# Patient Record
Sex: MALE | Race: WHITE | Employment: OTHER | URBAN - METROPOLITAN AREA
[De-identification: names, ages, dates, MRNs, and addresses within clinical notes are randomized per-mention and may not be internally consistent; named-entity substitution may affect disease eponyms.]

---

## 2017-02-14 ENCOUNTER — OFFICE VISIT (OUTPATIENT)
Dept: OTOLARYNGOLOGY | Facility: CLINIC | Age: 77
End: 2017-02-14
Payer: COMMERCIAL

## 2017-02-14 ENCOUNTER — OFFICE VISIT (OUTPATIENT)
Dept: AUDIOLOGY | Facility: CLINIC | Age: 77
End: 2017-02-14
Payer: COMMERCIAL

## 2017-02-14 VITALS — WEIGHT: 199 LBS | HEIGHT: 69 IN | RESPIRATION RATE: 20 BRPM | BODY MASS INDEX: 29.47 KG/M2

## 2017-02-14 DIAGNOSIS — J31.0 CHRONIC RHINITIS: Primary | ICD-10-CM

## 2017-02-14 DIAGNOSIS — H65.93 OTITIS MEDIA WITH EFFUSION, BILATERAL: ICD-10-CM

## 2017-02-14 DIAGNOSIS — J34.89 NASAL OBSTRUCTION: ICD-10-CM

## 2017-02-14 DIAGNOSIS — H90.6 MIXED HEARING LOSS, BILATERAL: Primary | ICD-10-CM

## 2017-02-14 PROCEDURE — 92567 TYMPANOMETRY: CPT | Performed by: AUDIOLOGIST

## 2017-02-14 PROCEDURE — 31231 NASAL ENDOSCOPY DX: CPT | Performed by: OTOLARYNGOLOGY

## 2017-02-14 PROCEDURE — 92557 COMPREHENSIVE HEARING TEST: CPT | Performed by: AUDIOLOGIST

## 2017-02-14 PROCEDURE — 99207 ZZC NO CHARGE LOS: CPT | Performed by: AUDIOLOGIST

## 2017-02-14 PROCEDURE — 69433 CREATE EARDRUM OPENING: CPT | Performed by: OTOLARYNGOLOGY

## 2017-02-14 PROCEDURE — 99214 OFFICE O/P EST MOD 30 MIN: CPT | Mod: 25 | Performed by: OTOLARYNGOLOGY

## 2017-02-14 RX ORDER — FLUTICASONE PROPIONATE 50 MCG
2 SPRAY, SUSPENSION (ML) NASAL DAILY
Qty: 16 G | Refills: 3 | Status: SHIPPED | OUTPATIENT
Start: 2017-02-14 | End: 2018-05-14

## 2017-02-14 RX ORDER — OFLOXACIN 3 MG/ML
5 SOLUTION AURICULAR (OTIC) 2 TIMES DAILY
Qty: 10 ML | Refills: 0 | Status: SHIPPED | OUTPATIENT
Start: 2017-02-14 | End: 2017-02-21

## 2017-02-14 NOTE — MR AVS SNAPSHOT
"              After Visit Summary   2017    Bg Kramer    MRN: [id number]           Patient Information     Date Of Birth          1940        Visit Information        Provider Department      2017 12:30 PM Jorge [facility]osto [facility]        Today's Diagnoses     Mixed hearing loss, bilateral    -  1       Follow-ups after your visit        Who to contact     If you have questions or need follow up information about today's clinic visit or your schedule please contact [facility] directly at [phone].  Normal or non-critical lab and imaging results will be communicated to you by Cubeyouhart, letter or phone within 4 business days after the clinic has received the results. If you do not hear from us within 7 days, please contact the clinic through Cubeyouhart or phone. If you have a critical or abnormal lab result, we will notify you by phone as soon as possible.  Submit refill requests through HIT Community or call your pharmacy and they will forward the refill request to us. Please allow 3 business days for your refill to be completed.          Additional Information About Your Visit        MyChart Information     HIT Community lets you send messages to your doctor, view your test results, renew your prescriptions, schedule appointments and more. To sign up, go to [email or web address] . Click on \"Log in\" on the left side of the screen, which will take you to the Welcome page. Then click on \"Sign up Now\" on the right side of the page.     You will be asked to enter the access code listed below, as well as some personal information. Please follow the directions to create your username and password.     Your access code is: [email or web address]  Expires: 5/15/2017  1:02 PM     Your access code will  in 90 days. If you need help or a new code, please call your [facility] or [phone].        Care EveryWhere ID     This is your Care EveryWhere ID. This could be used by other " organizations to access your [place] medical records  [id number]         Blood Pressure from Last 3 Encounters:   No data found for BP    Weight from Last 3 Encounters:   02/14/17 199 lb (90.3 kg)   11/08/16 191 lb 12.8 oz (87 kg)              We Performed the Following     AUDIOGRAM/TYMPANOGRAM - INTERFACE     COMPREHENSIVE HEARING TEST     TYMPANOMETRY        Primary Care Provider Office Phone # Fax #    Avril NATHAN Angulo PA-C [phone]       [facility] [address] [place] [address]        Thank you!     Thank you for choosing [facility]  for your care. Our goal is always to provide you with excellent care. Hearing back from our patients is one way we can continue to improve our services. Please take a few minutes to complete the written survey that you may receive in the mail after your visit with us. Thank you!             Your Updated Medication List - Protect others around you: Learn how to safely use, store and throw away your medicines at www.disposemymeds.org.          This list is accurate as of: 2/14/17  1:02 PM.  Always use your most recent med list.                   Brand Name Dispense Instructions for use    ADVAIR DISKUS IN      Inhale 1 puff into the lungs 2 times daily.       albuterol 108 (90 BASE) MCG/ACT Inhaler   Generic drug:  albuterol      Inhale 1 puff into the lungs as needed.       aspirin 325 MG tablet      Take 325 mg by mouth 2 times daily.       ATENOLOL PO      Take 1 tablet by mouth once.       diclofenac 0.1 % ophthalmic solution    VOLTAREN    2.5 mL    Place 1 drop Into the left eye 3 times daily.       fish oil-omega-3 fatty acids 1000 MG capsule      Take 2 g by mouth daily.       ipratropium 0.06 % spray    ATROVENT    1 Box    Spray 2 sprays into both nostrils 3 times daily as needed for rhinitis       LISINOPRIL PO      Take  by mouth.       neomycin-polymyxin-dexamethasone 3.5-56065-7.1 Oint ophthalmic ointment    MAXITROL    1  Tube    Place 4 g into the right eye 3 times daily. Apply 1 squirt to sutures and right lower eyelid three times daily. Pieter Manuel M.D.       prednisoLONE acetate 1 % ophthalmic susp    PRED FORTE    10 mL    Place 1 drop Into the left eye 3 times daily.       SIMVASTATIN PO      Take 1 tablet by mouth once.

## 2017-02-14 NOTE — PROGRESS NOTES
Chief Complaint - Hearing loss    History of Present Illness - Bg Kramer is a 76 year old male who returns to me today with hearing loss in both ears.  It has been present and noticeable for approximately 4 months. I saw him 11/2016 and he had effusions on top of his sensorineural hearing loss. It was sudden in onset with bronchitis. There is no history of chronic ear disease or ear surgery, aside from one instance had right effusion that was drained.  With regards to recreational, , and work-related noise exposure has a lot from . No family history of hearing loss at a young age. The patient denies vertigo, otorrhea, otalgia. He still can't hear, has plugged ears.      Also has chronic nasal drainage, clear, both sides. Atrovent helped. However, he still has troubles breathing through the nose. Had cautery many years ago, and states breathing was difficult then. No bleeding. No pus. Wears oxygen for COPD.    Past Medical History -   Patient Active Problem List   Diagnosis     Macular drusen, ou     Entropion, involutional, right lower eyelid; hx Quickert repair     PSEUDOPHAKIA, ou        Current Medications -   Current Outpatient Prescriptions:      ipratropium (ATROVENT) 0.06 % nasal spray, Spray 2 sprays into both nostrils 3 times daily as needed for rhinitis, Disp: 1 Box, Rfl: 3     neomycin-polymyxin-dexamethasone (MAXITROL) 3.5-66517-0.1 OINT, Place 4 g into the right eye 3 times daily. Apply 1 squirt to sutures and right lower eyelid three times daily. Pieter Manuel M.D. , Disp: 1 Tube, Rfl: 1     prednisoLONE acetate (PRED FORTE) 1 % ophthalmic suspension, Place 1 drop Into the left eye 3 times daily., Disp: 10 mL, Rfl: 0     diclofenac (VOLTAREN) 0.1 % ophthalmic solution, Place 1 drop Into the left eye 3 times daily., Disp: 2.5 mL, Rfl: 0     LISINOPRIL PO, Take  by mouth., Disp: , Rfl:      SIMVASTATIN PO, Take 1 tablet by mouth once., Disp: , Rfl:      ATENOLOL PO, Take 1 tablet by  "mouth once., Disp: , Rfl:      Albuterol Sulfate (PROAIR HFA) 108 (90 BASE) MCG/ACT AERS, Inhale 1 puff into the lungs as needed., Disp: , Rfl:      Fluticasone-Salmeterol (ADVAIR DISKUS IN), Inhale 1 puff into the lungs 2 times daily., Disp: , Rfl:      fish oil-omega-3 fatty acids (FISH OIL) 1000 MG capsule, Take 2 g by mouth daily., Disp: , Rfl:      aspirin 325 MG tablet, Take 325 mg by mouth 2 times daily., Disp: , Rfl:     Allergies - No Known Allergies    Social History -   Social History     Social History     Marital Status:      Spouse Name: N/A     Number of Children: N/A     Years of Education: N/A     Social History Main Topics     Smoking status: Current Every Day Smoker     Smokeless tobacco: Not on file     Alcohol Use: Not on file     Drug Use: Not on file     Sexual Activity: Not on file     Other Topics Concern     Not on file     Social History Narrative     No narrative on file       Family History - noncontributory    Review of Systems - As per HPI and PMHx, otherwise 7 system review of the head and neck negative.    Physical Exam  Resp 20  Ht 1.75 m (5' 8.9\")  Wt 90.3 kg (199 lb)  BMI 29.47 kg/m2  General - The patient is in no distress.  Alert and oriented to person and place, answers questions and cooperates with examination appropriately.   Voice and Breathing - The patient was breathing comfortably without the use of accessory muscles. There was no wheezing, stridor, or stertor.  The patients voice was clear and strong.  Ears - The auricles are normal. Has bilateral middle ear effusion, no infection.   Eyes - Extraocular movements intact.  Sclera were not icteric or injected.  Neurological - Cranial nerves 2 through 12 were grossly intact. House-Brackmann grade 1 out of 6 bilaterally.   Nose - turbinates congested, septum mostly straight. No mass.    To further evaluate the nasal cavity, I performed flexible nasal endoscopy, and color photographs were taken for the permanent medical " record.  I first sprayed both sides of the nasal cavity with a mix of lidocaine and neosynephrine.  I then began on the right side using an adult flexible fiberoptic endoscope.  The septum was fairly straight and the nasal airway was open. Turbinates congested, no scarring. No abnormal secretions, purulence, or polyps were noted. The right middle turbinate and middle meatus were clearly visualized and normal in appearance.  Looking up, the olfactory cleft was unobstructed.  Going further back, the sphenoethmoid recess was normal in appearance, with healthy appearing mucosa on the face of the sphenoid.  The nasopharynx was unremarkable, and the eustachian tube opening on this side was unobstructed.    I then turned my attention to the left side.  Once again, the septum was fairly straight, and the airway was open, but turbinates congested.  No abnormal secretions, purulence, polyps were noted.  The left middle turbinate and middle meatus were clearly visualized and normal in appearance.  Looking up, the olfactory cleft was unobstructed.  Going further back the left sphenoethmoid recess was normal in appearance, and eustachian tube opening was unobstructed.    Audiologic Studies - An audiogram and tympanogram were performed at an outside facility, and I personally reviewed. The tympanogram shows mixed hearing loss with an airbone gap and also significant down-sloping high frequency sensorineural hearing loss.      Procedure - Bilateral Myringotomy without tube    Procedure - After discussion of the risks and benefits of myringotomy, informed consent was signed and placed in the chart.  I began with the RIGHT side.  I proceeded to position the patient in a semi-supine position in the examination chair.  Using the binocular surgical microscope, I then proceeded to clean the canal of cerumen and squamous debris.  I was able to see the tympanic membrane.  Using a small cotton tipped applicator, I applied a tiny coating of  phenol onto the tympanic membrane.  After visualizing a good diane, I then proceeded to use a myringotomy knife to make a radially oriented incision in the tympanic membrane.  A moderate amount of clear yellow effusion was suctioned away.    I know moved on to the left side. Using the binocular surgical microscope, I then proceeded to clean the canal of cerumen and squamous debris.  I was able to see the tympanic membrane.  Using a small cotton tipped applicator, I applied a tiny coating of phenol onto the tympanic membrane.  After visualizing a good diane, I then proceeded to use a myringotomy knife to make a radially oriented incision in the tympanic membrane.  A moderate amount of clear yellow effusion was suctioned away.    Assessment and Plan - Bg Kramer is a 76 year old male who has persistent otitis media with effusion bilaterally after 4-5 months. I discussed myringotomy with or without tubes today, and he preferred without tubes. NP scope shows chronic rhinitis, but no masses or infection. Ofloxacin prescribed, as was flonase. He also can continue atrovent for clear rhinorrhea. Return in 2-4 weeks for ear check.     Carmine Robertson MD  Otolaryngology  [facility]

## 2017-02-14 NOTE — NURSING NOTE
Flexible scope was used during today's office visit.  Scope number: [id number]. Color ID: Cristo Worthington, Clinic Medical Assistant

## 2017-02-14 NOTE — MR AVS SNAPSHOT
After Visit Summary   2/14/2017    Bg Kramer    MRN: [id number]           Patient Information     Date Of Birth          1940        Visit Information        Provider Department      2/14/2017 1:00 PM Carmine Robertson MD [facility]        Today's Diagnoses     Chronic rhinitis    -  1    Otitis media with effusion, bilateral        Nasal obstruction           Follow-ups after your visit        Additional Services     AUDIOLOGY ADULT REFERRAL       Your provider has referred you to: [facility]: [facility] [phone]   [email or web address]    Treatment:  Evaluation & Treatment  Specialty Testing:  Audiogram w/Tymps and Reflexes    Please be aware that coverage of these services is subject to the terms and limitations of your health insurance plan.  Call member services at your health plan with any benefit or coverage questions.      Please bring the following to your appointment:  >>   Any x-rays, CTs or MRIs which have been performed.  Contact the facility where they were done to arrange for  prior to your scheduled appointment.   >>   List of current medications  >>   This referral request   >>   Any documents/labs given to you for this referral                  Follow-up notes from your care team     Return in about 4 weeks (around 3/14/2017).      Your next 10 appointments already scheduled     Mar 08, 2017  1:00 PM CST   New Visit with Carmine Robertson MD   [facility] ([facility])    [address] [place] [id number] [phone]              Who to contact     If you have questions or need follow up information about today's clinic visit or your schedule please contact [facility] directly at [phone].  Normal or non-critical lab and imaging results will be communicated to you by MyChart, letter or phone within 4 business days after the clinic has received the  "results. If you do not hear from us within 7 days, please contact the clinic through Muzzley or phone. If you have a critical or abnormal lab result, we will notify you by phone as soon as possible.  Submit refill requests through Muzzley or call your pharmacy and they will forward the refill request to us. Please allow 3 business days for your refill to be completed.          Additional Information About Your Visit        Muzzley Information     Muzzley lets you send messages to your doctor, view your test results, renew your prescriptions, schedule appointments and more. To sign up, go to [email or web address] . Click on \"Log in\" on the left side of the screen, which will take you to the Welcome page. Then click on \"Sign up Now\" on the right side of the page.     You will be asked to enter the access code listed below, as well as some personal information. Please follow the directions to create your username and password.     Your access code is: [email or web address]  Expires: 5/15/2017  1:02 PM     Your access code will  in 90 days. If you need help or a new code, please call your [place] clinic or [phone].        Care EveryWhere ID     This is your Care EveryWhere ID. This could be used by other organizations to access your [place] medical records  [id number]        Your Vitals Were     Respirations Height BMI (Body Mass Index)             20 1.75 m (5' 8.9\") 29.47 kg/m2          Blood Pressure from Last 3 Encounters:   No data found for BP    Weight from Last 3 Encounters:   17 90.3 kg (199 lb)   16 87 kg (191 lb 12.8 oz)              We Performed the Following     AUDIOLOGY ADULT REFERRAL     Nasal Endoscopy, Diag     Tympanotomy W/Tube          Today's Medication Changes          These changes are accurate as of: 17  2:28 PM.  If you have any questions, ask your nurse or doctor.               Start taking these medicines.        Dose/Directions    fluticasone 50 MCG/ACT spray "   Commonly known as:  FLONASE   Used for:  Chronic rhinitis   Started by:  Carmine Robertson MD        Dose:  2 spray   Spray 2 sprays into both nostrils daily   Quantity:  16 g   Refills:  3       ofloxacin 0.3 % otic solution   Commonly known as:  FLOXIN   Used for:  Otitis media with effusion, bilateral   Started by:  Carmine Robertson MD        Dose:  5 drop   Place 5 drops into both ears 2 times daily for 7 days   Quantity:  10 mL   Refills:  0            Where to get your medicines      These medications were sent to [id number] - [place], [place] - [address] [facility] [address]  [address], [place] [address]     Phone:  [phone]     fluticasone 50 MCG/ACT spray    ofloxacin 0.3 % otic solution                Primary Care Provider Office Phone # Fax #    Avril Angulo PA-C [phone]       [address] [address]        Thank you!     Thank you for choosing [facility]  for your care. Our goal is always to provide you with excellent care. Hearing back from our patients is one way we can continue to improve our services. Please take a few minutes to complete the written survey that you may receive in the mail after your visit with us. Thank you!             Your Updated Medication List - Protect others around you: Learn how to safely use, store and throw away your medicines at www.disposemymeds.org.          This list is accurate as of: 2/14/17  2:28 PM.  Always use your most recent med list.                   Brand Name Dispense Instructions for use    ADVAIR DISKUS IN      Inhale 1 puff into the lungs 2 times daily.       albuterol 108 (90 BASE) MCG/ACT Inhaler   Generic drug:  albuterol      Inhale 1 puff into the lungs as needed.       aspirin 325 MG tablet      Take 325 mg by mouth 2 times daily.       ATENOLOL PO      Take 1 tablet by mouth once.       diclofenac 0.1 % ophthalmic solution     VOLTAREN    2.5 mL    Place 1 drop Into the left eye 3 times daily.       fish oil-omega-3 fatty acids 1000 MG capsule      Take 2 g by mouth daily.       fluticasone 50 MCG/ACT spray    FLONASE    16 g    Spray 2 sprays into both nostrils daily       ipratropium 0.06 % spray    ATROVENT    1 Box    Spray 2 sprays into both nostrils 3 times daily as needed for rhinitis       LISINOPRIL PO      Take  by mouth.       neomycin-polymyxin-dexamethasone 3.5-44563-7.1 Oint ophthalmic ointment    MAXITROL    1 Tube    Place 4 g into the right eye 3 times daily. Apply 1 squirt to sutures and right lower eyelid three times daily. Pieter Manuel M.D.       ofloxacin 0.3 % otic solution    FLOXIN    10 mL    Place 5 drops into both ears 2 times daily for 7 days       prednisoLONE acetate 1 % ophthalmic susp    PRED FORTE    10 mL    Place 1 drop Into the left eye 3 times daily.       SIMVASTATIN PO      Take 1 tablet by mouth once.

## 2017-02-14 NOTE — NURSING NOTE
"Chief Complaint   Patient presents with     RECHECK     plugged ears       Initial Resp 20  Ht 1.75 m (5' 8.9\")  Wt 90.3 kg (199 lb)  BMI 29.47 kg/m2 Estimated body mass index is 29.47 kg/(m^2) as calculated from the following:    Height as of this encounter: 1.75 m (5' 8.9\").    Weight as of this encounter: 90.3 kg (199 lb).  Medication Reconciliation: unable or not appropriate to perform     Lauryn Worthington Clinic Medical Assistant     "

## 2017-02-14 NOTE — PROGRESS NOTES
Patient was referred to Audiology from ENT by Dr. Robertson for a hearing examination. Patient reports hearing loss which has become much worse recently. Patient reports valsalva maneuver only helps momentarily.  Patient was accompanied to today's appointment by his wife, Flor.     Testing:    Otoscopy:   Clear canals free of cerumen bilaterally.     Tympanograms:   Tympanometric findings were normal ear canal volume with no compliance (Type B) bilaterally.      Thresholds:   Puretone thresholds obtained with insert earphones show mild to profound mixed loss bilaterally, with the left ear being slightly worse (see scanned audiogram). Speech reception thresholds were in agreement with puretone findings bilaterally. Speech discrimination scores were average bilaterally (Right: 84%; Left: 76%).     Discussed results with the patient.     Patient was returned to ENT for follow up.     Jorge Conti CCC-A  Licensed Audiologist  2/14/2017

## 2017-03-08 ENCOUNTER — OFFICE VISIT (OUTPATIENT)
Dept: OTOLARYNGOLOGY | Facility: CLINIC | Age: 77
End: 2017-03-08
Payer: COMMERCIAL

## 2017-03-08 VITALS — BODY MASS INDEX: 29.47 KG/M2 | HEIGHT: 69 IN | WEIGHT: 199 LBS | RESPIRATION RATE: 20 BRPM

## 2017-03-08 DIAGNOSIS — Z96.22 S/P TUBE MYRINGOTOMY: ICD-10-CM

## 2017-03-08 DIAGNOSIS — J34.89 NASAL OBSTRUCTION: Primary | ICD-10-CM

## 2017-03-08 DIAGNOSIS — J31.0 CHRONIC RHINITIS: ICD-10-CM

## 2017-03-08 PROCEDURE — 99213 OFFICE O/P EST LOW 20 MIN: CPT | Performed by: OTOLARYNGOLOGY

## 2017-03-08 ASSESSMENT — PAIN SCALES - GENERAL: PAINLEVEL: NO PAIN (0)

## 2017-03-08 NOTE — PROGRESS NOTES
"History of Present Illness - Bg Kramer is a 76 year old male who is status post bilateral myringotomy (No tubes) on 2/14/2017.  There were no issues post operatively, and the patient is back to a regular diet and normal daily activity.  There has been no drainage or bleeding from the ears, no fevers or chills.    He notes persistent nasal obstruction at night. Uses flonase. Right side breathing is worse. It clears when he gets up in the day.    Past Medical History -       Patient Active Problem List   Diagnosis     Macular drusen, ou     Entropion, involutional, right lower eyelid; hx Quickert repair     PSEUDOPHAKIA, ou    Otitis media with effusion, bilateral      ROS - 7 system review of the head and neck is negative    Exam -  Resp 20  Ht 1.75 m (5' 8.9\")  Wt 90.3 kg (199 lb)  BMI 29.47 kg/m2  General - The patient is well nourished and well developed, and appears to have good nutritional status.    Head and Face - Normocephalic and atraumatic, with no gross asymmetry noted of the contour of the facial features.  The facial nerve is intact, with strong symmetric movements.  Ears - Examination of the ears showed myringotomy perforations still present, but closing. RIght perforation is 1-2 mm inferior and posterior. Left perforation is 2-3 mm inferior and posterior.  The tympanic membranes were gray and translucent.  No evidence of middle ear effusion, granulation tissue, or cholesteatoma.  Nose- septum deviated to the right. Turbinate hypertrophy. No masses or pus.    A/P - Bg Kramer is status post bilateral myringotomy for otitis media with effusions. He still has small perforations, but no effusion or infection. I have rediscussed water precautions, and will see the patient back in 3-4 months for a hearing test and check of the perforations. I have also recommended the use of the post-op ear drops in the event of otorrhea during a upper respiratory infection or from water exposure.      For his nose he has " a septal deviation, but with his age and health i wouldn't recommend surgery. He can use flonase and breath right strips.

## 2017-03-08 NOTE — PATIENT INSTRUCTIONS
General Scheduling Information  To schedule your CT/MRI scan, please contact [place] [facility] at [phone]   [address] [place] [address]    To schedule your Surgery, please contact our Specialty Schedulers at [phone]    ENT Clinic Locations Clinic Hours Telephone Number     Br[facility] Guallpa  [address] [place] [address]   Tuesday:       8:00am -- 4:00pm    Wednesday:  8:00am - 4:00pm   To schedule an appointment with   Dr. Robertson,   please contact our   Specialty Scheduling Department at:     [phone]       Br[facility]ida Poon  [address] [place] [address]   Friday:          8:00am - 4:00pm         Urgent Care Locations Clinic Hours Telephone Numbers     Briseida Mckay  [address] [place] [address]     Monday-Friday:     11:00pm - 9:00pm    Saturday-Sunday:  9:00am - 5:00pm   [phone]     Br[facility]ida Poon  [address] [place] [address]     Monday-Friday:      5:00pm - 9:00pm     Saturday-Sunday:  9:00am - 5:00pm   [phone]

## 2017-03-08 NOTE — NURSING NOTE
"Chief Complaint   Patient presents with     RECHECK     Ears       Initial Resp 20  Ht 1.75 m (5' 8.9\")  Wt 90.3 kg (199 lb)  BMI 29.47 kg/m2 Estimated body mass index is 29.47 kg/(m^2) as calculated from the following:    Height as of this encounter: 1.75 m (5' 8.9\").    Weight as of this encounter: 90.3 kg (199 lb).  Medication Reconciliation: complete     Nancy Collins MA    "

## 2017-03-08 NOTE — MR AVS SNAPSHOT
After Visit Summary   3/8/2017    Bg Kramer    MRN: [id number]           Patient Information     Date Of Birth          1940        Visit Information        Provider Department      3/8/2017 1:00 PM [facility]        Today's Diagnoses     Nasal obstruction    -  1    Chronic rhinitis        S/P tube myringotomy          Care Instructions    General Scheduling Information  To schedule your CT/MRI scan, please contact [place] at [phone] [address] [place] [address]    To schedule your Surgery, please contact our Specialty Schedulers at [phone]    ENT Clinic Locations Clinic Hours Telephone Number     [facility]  [address]  [place] [address]   Tuesday:       8:00am -- 4:00pm    Wednesday:  8:00am - 4:00pm   To schedule an appointment with   Dr. Robertson,   please contact our   Specialty Scheduling Department at:     [phone]       [facility]  [address] [place] [address]   Friday:          8:00am - 4:00pm         Urgent Care Locations Clinic Hours Telephone Numbers     [facility]  [address]  [place] [address]     Monday-Friday:     11:00pm - 9:00pm    Saturday-Sunday:  9:00am - 5:00pm   [phone]     [facility]  [address] [place] [address]     Monday-Friday:      5:00pm - 9:00pm     Saturday-Sunday:  9:00am - 5:00pm   [phone]             Follow-ups after your visit        Who to contact     If you have questions or need follow up information about today's clinic visit or your schedule please contact [facility] directly at [phone].  Normal or non-critical lab and imaging results will be communicated to you by MyChart, letter or phone within 4 business days after the clinic has received the results. If you do not hear from us within 7 days, please contact the clinic through MyChart or phone. If you have a critical or  "abnormal lab result, we will notify you by phone as soon as possible.  Submit refill requests through [employer] or call your pharmacy and they will forward the refill request to us. Please allow 3 business days for your refill to be completed.          Additional Information About Your Visit        MyChart Information     [employer] lets you send messages to your doctor, view your test results, renew your prescriptions, schedule appointments and more. To sign up, go to [email or web address] . Click on \"Log in\" on the left side of the screen, which will take you to the Welcome page. Then click on \"Sign up Now\" on the right side of the page.     You will be asked to enter the access code listed below, as well as some personal information. Please follow the directions to create your username and password.     Your access code is: [email or web address]  Expires: 5/15/2017  1:02 PM     Your access code will  in 90 days. If you need help or a new code, please call your [place] clinic or [phone].        Care EveryWhere ID     This is your Care EveryWhere ID. This could be used by other organizations to access your [facility] medical records  [id number]        Your Vitals Were     Respirations Height BMI (Body Mass Index)             20 1.75 m (5' 8.9\") 29.47 kg/m2          Blood Pressure from Last 3 Encounters:   No data found for BP    Weight from Last 3 Encounters:   17 90.3 kg (199 lb)   17 90.3 kg (199 lb)   16 87 kg (191 lb 12.8 oz)              Today, you had the following     No orders found for display       Primary Care Provider Office Phone # Fax #    Avril Angulo PA-C [phone]       [address] [address]        Thank you!     Thank you for choosing [facility]  for your care. Our goal is always to provide you with excellent care. Hearing back from our patients is one way we can continue to improve our services. Please take a " few minutes to complete the written survey that you may receive in the mail after your visit with us. Thank you!             Your Updated Medication List - Protect others around you: Learn how to safely use, store and throw away your medicines at www.disposemymeds.org.          This list is accurate as of: 3/8/17  1:37 PM.  Always use your most recent med list.                   Brand Name Dispense Instructions for use    ADVAIR DISKUS IN      Inhale 1 puff into the lungs 2 times daily.       albuterol 108 (90 BASE) MCG/ACT Inhaler   Generic drug:  albuterol      Inhale 1 puff into the lungs as needed.       aspirin 325 MG tablet      Take 325 mg by mouth 2 times daily.       ATENOLOL PO      Take 1 tablet by mouth once.       diclofenac 0.1 % ophthalmic solution    VOLTAREN    2.5 mL    Place 1 drop Into the left eye 3 times daily.       fish oil-omega-3 fatty acids 1000 MG capsule      Take 2 g by mouth daily.       fluticasone 50 MCG/ACT spray    FLONASE    16 g    Spray 2 sprays into both nostrils daily       ipratropium 0.06 % spray    ATROVENT    1 Box    Spray 2 sprays into both nostrils 3 times daily as needed for rhinitis       LISINOPRIL PO      Take  by mouth.       neomycin-polymyxin-dexamethasone 3.5-38798-3.1 Oint ophthalmic ointment    MAXITROL    1 Tube    Place 4 g into the right eye 3 times daily. Apply 1 squirt to sutures and right lower eyelid three times daily. Pieter Manuel M.D.       prednisoLONE acetate 1 % ophthalmic susp    PRED FORTE    10 mL    Place 1 drop Into the left eye 3 times daily.       SIMVASTATIN PO      Take 1 tablet by mouth once.

## 2017-11-01 ENCOUNTER — OFFICE VISIT (OUTPATIENT)
Dept: OTOLARYNGOLOGY | Facility: CLINIC | Age: 77
End: 2017-11-01
Payer: COMMERCIAL

## 2017-11-01 VITALS — WEIGHT: 192.4 LBS | BODY MASS INDEX: 29.16 KG/M2 | TEMPERATURE: 96.5 F | HEIGHT: 68 IN

## 2017-11-01 DIAGNOSIS — H65.93 OTITIS MEDIA WITH EFFUSION, BILATERAL: Primary | ICD-10-CM

## 2017-11-01 PROCEDURE — 99213 OFFICE O/P EST LOW 20 MIN: CPT | Performed by: OTOLARYNGOLOGY

## 2017-11-01 NOTE — PROGRESS NOTES
"History of Present Illness - Bg Kramer is a 77 year old male who is status post bilateral myringotomy (No tubes) on 2/14/2017.  He did well, but ears are plugged again. No drainage. No pain. Can't hear well. Uses oxygen.     Past Medical History -       Patient Active Problem List   Diagnosis     Macular drusen, ou     Entropion, involutional, right lower eyelid; hx Quickert repair     PSEUDOPHAKIA, ou    Otitis media with effusion, bilateral      ROS - 7 system review of the head and neck is negative    Exam -  Temp 96.5  F (35.8  C) (Tympanic)  Ht 1.727 m (5' 8\")  Wt 87.3 kg (192 lb 6.4 oz)  BMI 29.25 kg/m2  General - The patient is well nourished and well developed, and appears to have good nutritional status.    Head and Face - Normocephalic and atraumatic, with no gross asymmetry noted of the contour of the facial features.  The facial nerve is intact, with strong symmetric movements.  Ears - Examination of the ears showed myringotomy perforations healed. He has reaccumulation of fluid in middle ears.   Neck - no masses or lymphadenopathy, no erythema.   Neuro - CNII-XII intact. HB 1 of 6.     A/P - Bg Kramer is status post bilateral myringotomy for otitis media with effusions. Effusions are back. I recommend he return for bilateral myringotomy with tubes this time. We discussed risks of perforation, infections, need for patch, need for new tubes. He agrees and will return.   "

## 2017-11-01 NOTE — PATIENT INSTRUCTIONS
General Scheduling Information  To schedule your CT/MRI scan, please contact [place] Connor at [phone]   [address] [place] [address]    To schedule your Surgery, please contact our Specialty Schedulers at [phone]    ENT Clinic Locations Clinic Hours Telephone Number     [facility]  [address] [place] [address]   Tuesday:       8:00am -- 4:00pm    Wednesday:  8:00am - 4:00pm   To schedule an appointment with   Dr. Robertson,   please contact our   Specialty Scheduling Department at:     [phone]       [facility]  [address] [place] [address]   Friday:          8:00am - 4:00pm         Urgent Care Locations Clinic Hours Telephone Numbers     [facility]  [address] [place] [address]     Monday-Friday:     11:00pm - 9:00pm    Saturday-Sunday:  9:00am - 5:00pm   [phone]     [facility]  [address] [place] [address]     Monday-Friday:      5:00pm - 9:00pm     Saturday-Sunday:  9:00am - 5:00pm   [phone]

## 2017-11-01 NOTE — NURSING NOTE
"Chief Complaint   Patient presents with     RECHECK     flush ear        Initial Temp 96.5  F (35.8  C) (Tympanic)  Ht 1.727 m (5' 8\")  Wt 87.3 kg (192 lb 6.4 oz)  BMI 29.25 kg/m2 Estimated body mass index is 29.25 kg/(m^2) as calculated from the following:    Height as of this encounter: 1.727 m (5' 8\").    Weight as of this encounter: 87.3 kg (192 lb 6.4 oz).  Medication Reconciliation: complete       Ellen Hargrove MA      "

## 2017-11-01 NOTE — MR AVS SNAPSHOT
After Visit Summary   11/1/2017    Bg Kramer    MRN: [id number]           Patient Information     Date Of Birth          1940        Visit Information        Provider Department      11/1/2017 1:30 PM Carmine [facility] MD [facility]        Today's Diagnoses     Otitis media with effusion, bilateral    -  1      Care Instructions    General Scheduling Information  To schedule your CT/MRI scan, please contact [place] Connor at [phone] [address] [place] [address]    To schedule your Surgery, please contact our Specialty Schedulers at [phone]    ENT Clinic Locations Clinic Hours Telephone Number     [facility]  [address]  [place] [address]   Tuesday:       8:00am -- 4:00pm    Wednesday:  8:00am - 4:00pm   To schedule an appointment with   Dr. Robertson,   please contact our   Specialty Scheduling Department at:     [phone]       [facility]  [address]   [place] [address]   Friday:          8:00am - 4:00pm         Urgent Care Locations Clinic Hours Telephone Numbers     [facility]  [address]   [place] [address]     Monday-Friday:     11:00pm - 9:00pm    Saturday-Sunday:  9:00am - 5:00pm   [phone]     [facility]  [address]   [place] [address]     Monday-Friday:      5:00pm - 9:00pm     Saturday-Sunday:  9:00am - 5:00pm   [phone]               Follow-ups after your visit        Your next 10 appointments already scheduled     Nov 08, 2017  1:30 PM CST   Return Visit with Carmine Robertson MD   [facility]    [address] [place] [address] [id number] [phone]              Who to contact     If you have questions or need follow up information about today's clinic visit or your schedule please contact [facility] directly at [phone].  Normal or non-critical lab and imaging results will be communicated to you  "by MyChart, letter or phone within 4 business days after the clinic has received the results. If you do not hear from us within 7 days, please contact the clinic through [employer] or phone. If you have a critical or abnormal lab result, we will notify you by phone as soon as possible.  Submit refill requests through [email or web address] or call your pharmacy and they will forward the refill request to us. Please allow 3 business days for your refill to be completed.          Additional Information About Your Visit        [email or web address] Information     [email or web address] lets you send messages to your doctor, view your test results, renew your prescriptions, schedule appointments and more. To sign up, go to [email or web address] . Click on \"Log in\" on the left side of the screen, which will take you to the Welcome page. Then click on \"Sign up Now\" on the right side of the page.     You will be asked to enter the access code listed below, as well as some personal information. Please follow the directions to create your username and password.     Your access code is: [email or web address]  Expires: 2018  5:40 PM     Your access code will  in 90 days. If you need help or a new code, please call your [facility] clinic or [phone].        Care EveryWhere ID     This is your Care EveryWhere ID. This could be used by other organizations to access your [facility] medical records  [id number]        Your Vitals Were     Temperature Height BMI (Body Mass Index)             96.5  F (35.8  C) (Tympanic) 1.727 m (5' 8\") 29.25 kg/m2          Blood Pressure from Last 3 Encounters:   No data found for BP    Weight from Last 3 Encounters:   17 87.3 kg (192 lb 6.4 oz)   17 90.3 kg (199 lb)   17 90.3 kg (199 lb)              Today, you had the following     No orders found for display       Primary Care Provider Office Phone # Fax #    Avril Angulo PA-C [phone]       [address] [address]   "      Equal Access to Services     [facility] : Hadii gaby rico delfly Cassidy, wamalgorzatada luqadaha, qaybta kamaryannette palmerlindynasreen, carolann yen. So [facility] [phone].    ATENCIÓN: Si habla yahaira, tiene a salas disposición servicios gratuitos de asistencia lingüística. Mohaname al [phone].    We comply with applicable federal civil rights laws and [place] laws. We do not discriminate on the basis of race, color, national origin, age, disability, sex, sexual orientation, or gender identity.            Thank you!     Thank you for choosing [facility]  for your care. Our goal is always to provide you with excellent care. Hearing back from our patients is one way we can continue to improve our services. Please take a few minutes to complete the written survey that you may receive in the mail after your visit with us. Thank you!             Your Updated Medication List - Protect others around you: Learn how to safely use, store and throw away your medicines at www.disposemymeds.org.          This list is accurate as of: 11/1/17  5:40 PM.  Always use your most recent med list.                   Brand Name Dispense Instructions for use Diagnosis    ADVAIR DISKUS IN      Inhale 1 puff into the lungs 2 times daily.        aspirin 325 MG tablet      Take 325 mg by mouth 2 times daily.        ATENOLOL PO      Take 1 tablet by mouth once.        diclofenac 0.1 % ophthalmic solution    VOLTAREN    2.5 mL    Place 1 drop Into the left eye 3 times daily.    Cataract       fish oil-omega-3 fatty acids 1000 MG capsule      Take 2 g by mouth daily.        fluticasone 50 MCG/ACT spray    FLONASE    16 g    Spray 2 sprays into both nostrils daily    Chronic rhinitis       ipratropium 0.06 % spray    ATROVENT    1 Box    Spray 2 sprays into both nostrils 3 times daily as needed for rhinitis    Chronic rhinitis       LISINOPRIL PO      Take  by mouth.        neomycin-polymyxin-dexamethasone  3.5-36597-6.1 Oint ophthalmic ointment    MAXITROL    1 Tube    Place 4 g into the right eye 3 times daily. Apply 1 squirt to sutures and right lower eyelid three times daily. Pieter Manuel M.D.    Entropion       prednisoLONE acetate 1 % ophthalmic susp    PRED FORTE    10 mL    Place 1 drop Into the left eye 3 times daily.    Cataract       PROAIR  (90 BASE) MCG/ACT Inhaler   Generic drug:  albuterol      Inhale 1 puff into the lungs as needed.        SIMVASTATIN PO      Take 1 tablet by mouth once.

## 2017-11-01 NOTE — LETTER
"    11/1/2017         RE: Bg Kramer  [address] [address]        Dear Colleague,    Thank you for referring your patient, Bg Kramer, to the [facility]. Please see a copy of my visit note below.    History of Present Illness - Bg Kramer is a 77 year old male who is status post bilateral myringotomy (No tubes) on 2/14/2017.  He did well, but ears are plugged again. No drainage. No pain. Can't hear well. Uses oxygen.     Past Medical History -       Patient Active Problem List   Diagnosis     Macular drusen, ou     Entropion, involutional, right lower eyelid; hx Quickert repair     PSEUDOPHAKIA, ou    Otitis media with effusion, bilateral      ROS - 7 system review of the head and neck is negative    Exam -  Temp 96.5  F (35.8  C) (Tympanic)  Ht 1.727 m (5' 8\")  Wt 87.3 kg (192 lb 6.4 oz)  BMI 29.25 kg/m2  General - The patient is well nourished and well developed, and appears to have good nutritional status.    Head and Face - Normocephalic and atraumatic, with no gross asymmetry noted of the contour of the facial features.  The facial nerve is intact, with strong symmetric movements.  Ears - Examination of the ears showed myringotomy perforations healed. He has reaccumulation of fluid in middle ears.   Neck - no masses or lymphadenopathy, no erythema.   Neuro - CNII-XII intact. HB 1 of 6.     A/P - Bg Kramer is status post bilateral myringotomy for otitis media with effusions. Effusions are back. I recommend he return for bilateral myringotomy with tubes this time. We discussed risks of perforation, infections, need for patch, need for new tubes. He agrees and will return.     Again, thank you for allowing me to participate in the care of your patient.        Sincerely,        Carmine Robertson MD    "

## 2018-03-27 ENCOUNTER — OFFICE VISIT (OUTPATIENT)
Dept: AUDIOLOGY | Facility: CLINIC | Age: 78
End: 2018-03-27
Payer: COMMERCIAL

## 2018-03-27 ENCOUNTER — OFFICE VISIT (OUTPATIENT)
Dept: OTOLARYNGOLOGY | Facility: CLINIC | Age: 78
End: 2018-03-27
Payer: COMMERCIAL

## 2018-03-27 VITALS
BODY MASS INDEX: 26.55 KG/M2 | RESPIRATION RATE: 20 BRPM | HEIGHT: 68 IN | HEART RATE: 68 BPM | SYSTOLIC BLOOD PRESSURE: 94 MMHG | DIASTOLIC BLOOD PRESSURE: 55 MMHG | OXYGEN SATURATION: 93 % | WEIGHT: 175.2 LBS

## 2018-03-27 DIAGNOSIS — H69.93 DISORDER OF BOTH EUSTACHIAN TUBES: ICD-10-CM

## 2018-03-27 DIAGNOSIS — H93.13 TINNITUS OF BOTH EARS: ICD-10-CM

## 2018-03-27 DIAGNOSIS — Z96.22 S/P MYRINGOTOMY WITH INSERTION OF TUBE: ICD-10-CM

## 2018-03-27 DIAGNOSIS — H65.93 OME (OTITIS MEDIA WITH EFFUSION), BILATERAL: Primary | ICD-10-CM

## 2018-03-27 DIAGNOSIS — H90.6 MIXED CONDUCTIVE AND SENSORINEURAL HEARING LOSS OF BOTH EARS: Primary | ICD-10-CM

## 2018-03-27 PROCEDURE — 69433 CREATE EARDRUM OPENING: CPT | Mod: 50 | Performed by: OTOLARYNGOLOGY

## 2018-03-27 PROCEDURE — 92567 TYMPANOMETRY: CPT | Performed by: AUDIOLOGIST

## 2018-03-27 PROCEDURE — 99213 OFFICE O/P EST LOW 20 MIN: CPT | Mod: 25 | Performed by: OTOLARYNGOLOGY

## 2018-03-27 PROCEDURE — 92557 COMPREHENSIVE HEARING TEST: CPT | Performed by: AUDIOLOGIST

## 2018-03-27 RX ORDER — OFLOXACIN 3 MG/ML
5 SOLUTION AURICULAR (OTIC) 2 TIMES DAILY
Qty: 10 ML | Refills: 0 | Status: SHIPPED | OUTPATIENT
Start: 2018-03-27 | End: 2018-04-01

## 2018-03-27 NOTE — PROGRESS NOTES
Chief Complaint - Hearing loss    History of Present Illness - Bg Kramer is a 77 year old male who returns to me today with hearing loss or a plugged feeling in both ears.  It has been present and noticeable for approximately many months now. I last saw him 11/2017 and he had bilateral effusions then. He is status post bilateral myringotomy (No tubes) on 2/14/2017.  He did well, but ears are plugged again. No drainage. No pain. Can't hear well. Uses oxygen.     Past Medical History -   Patient Active Problem List   Diagnosis     Macular drusen, ou     Entropion, involutional, right lower eyelid; hx Quickert repair     PSEUDOPHAKIA, ou        Current Medications -   Current Outpatient Prescriptions:      ofloxacin (FLOXIN) 0.3 % otic solution, Place 5 drops into both ears 2 times daily for 5 days, Disp: 10 mL, Rfl: 0     fluticasone (FLONASE) 50 MCG/ACT spray, Spray 2 sprays into both nostrils daily, Disp: 16 g, Rfl: 3     ipratropium (ATROVENT) 0.06 % nasal spray, Spray 2 sprays into both nostrils 3 times daily as needed for rhinitis, Disp: 1 Box, Rfl: 3     LISINOPRIL PO, Take  by mouth., Disp: , Rfl:      SIMVASTATIN PO, Take 1 tablet by mouth once., Disp: , Rfl:      ATENOLOL PO, Take 1 tablet by mouth once., Disp: , Rfl:      Albuterol Sulfate (PROAIR HFA) 108 (90 BASE) MCG/ACT AERS, Inhale 1 puff into the lungs as needed., Disp: , Rfl:      Fluticasone-Salmeterol (ADVAIR DISKUS IN), Inhale 1 puff into the lungs 2 times daily., Disp: , Rfl:      aspirin 325 MG tablet, Take 325 mg by mouth 2 times daily., Disp: , Rfl:      neomycin-polymyxin-dexamethasone (MAXITROL) 3.5-86721-6.1 OINT, Place 4 g into the right eye 3 times daily. Apply 1 squirt to sutures and right lower eyelid three times daily. Pieter Manuel M.D.  (Patient not taking: Reported on 3/27/2018), Disp: 1 Tube, Rfl: 1     prednisoLONE acetate (PRED FORTE) 1 % ophthalmic suspension, Place 1 drop Into the left eye 3 times daily. (Patient not  "taking: Reported on 3/27/2018), Disp: 10 mL, Rfl: 0     diclofenac (VOLTAREN) 0.1 % ophthalmic solution, Place 1 drop Into the left eye 3 times daily. (Patient not taking: Reported on 3/27/2018), Disp: 2.5 mL, Rfl: 0     fish oil-omega-3 fatty acids (FISH OIL) 1000 MG capsule, Take 2 g by mouth daily., Disp: , Rfl:     Allergies - No Known Allergies    Social History -   Social History     Social History     Marital status:      Spouse name: N/A     Number of children: N/A     Years of education: N/A     Social History Main Topics     Smoking status: Former Smoker     Smokeless tobacco: Never Used     Alcohol use None     Drug use: None     Sexual activity: Not Asked     Other Topics Concern     None     Social History Narrative       Family History - no chronic ear disease noted.     Review of Systems - As per HPI and PMHx, otherwise 7 system review of the head and neck negative.    Physical Exam  BP 94/55  Pulse 68  Resp 20  Ht 1.727 m (5' 8\")  Wt 79.5 kg (175 lb 3.2 oz)  SpO2 93%  BMI 26.64 kg/m2  General - The patient is nontoxic, in no distress.  Alert and oriented to person and place, answers questions and cooperates with examination appropriately.   Voice and Breathing - The patient was breathing comfortably without the use of accessory muscles. There was no wheezing, stridor, or stertor.  The patients voice was clear and strong.  Ears - The tympanic membrane on the R is intact, but appears dull with a middle ear effusion. No acute infection.  No fluid or purulence was seen in the external canal. The tympanic membrane on the L is intact, + middle ear effusion. No acute infection.  No fluid or purulence was seen in the external canal.   Eyes - Extraocular movements intact. Sclera were not icteric or injected.  Neck - Palpation of the occipital, submental, submandibular, internal jugular chain, and supraclavicular nodes did not demonstrate any abnormal lymph nodes or masses. No parotid masses. " Palpation of the thyroid was soft and smooth, with no nodules or goiter appreciated.  The trachea was mobile and midline.  Neurological - Cranial nerves 2 through 12 were grossly intact. House-Brackmann grade 1 out of 6 bilaterally.    Audiologic Studies - An audiogram and tympanogram were performed today as part of the evaluation and personally reviewed. The tympanogram shows a type B, low volume on both sides.  The audiogram showed an air bone gap on both sides.    Bilateral Myringotomy with Tube Placement    Procedure - After discussion of the risks and benefits of myringotomy, informed consent was signed and placed in the chart.  I began with the left side.  I proceeded to position the patient in a semi-supine position in the examination chair.  Using the binocular surgical microscope, I then proceeded to clean the left ear canal free cerumen and squamous debris.  I was able to see the tympanic membrane.  Using a small cotton tipped applicator, I applied a tiny coating of phenol onto the tympanic membrane.  After visualizing a good diane, I then proceeded to use a myringotomy knife to make a radially oriented incision in the posterior and inferior quadrant of the left tympanic membrane.  A moderate amount of clear yellow effusion was suctioned away.  Next, I proceeded to place a duravent tube through the incision.  After confirming good positioning and a clearly visible open tube, the procedure was complete.    I know moved on to the right side. Using the binocular surgical microscope, I then proceeded to clean the canal of cerumen and squamous debris.  I was able to see the tympanic membrane. He has some retraction of the right anterior tympanic membrane, but no cholesteatoma.  Using a small cotton tipped applicator, I applied a tiny coating of phenol onto the tympanic membrane.  After visualizing a good diane, I then proceeded to use a myringotomy knife to make a radially oriented incision in the inferior  and posterior tympanic membrane.  A moderate amount of clear yellow effusion was suctioned away.  Next, I proceeded to place a duravent tube through the incision.  After confirming good positioning and a clearly visible open tube, the procedure was complete.    Assessment and Plan - Bg Kramer is a 77 year old male who returns to me today with hearing loss.  This is most consistent with a middle ear effusion in the both ears. Is a chronic issue as I have done just myringotomies in the past. Today with placed tubes.   The patient was instructed on water precautions and given a prescription for ofloxacin drops to use for 5 days.  I will see them in 2 to 4 weeks for follow up. Only needs repeat audiogram if hearing is still down.  The patient was instructed to call in or return sooner if there was any drainage from the ear.    Carmine Robertson MD  Otolaryngology  [facility]

## 2018-03-27 NOTE — PROGRESS NOTES
AUDIOLOGY REPORT: HEARING EXAM    SUBJECTIVE:  Bg Kramer is a 77 year old male referred to audiology from ENT by Dr. Robertson for a hearing examination. Patient reports aural fullness and decreased hearing in both ears for the past year. He also reports longstanding tinnitus and hearing loss in both ears.    OBJECTIVE:    Otoscopy:   RIGHT: non-occluding cerumen   LEFT:  non-occluding cerumen    Tympanometry:   RIGHT:  restricted eardrum mobility    LEFT:   negative pressure     Thresholds:   Pure Tone Thresholds assessed using standard techniques with good  reliability from 250-8000 Hz bilaterally using circumaural headphones.   RIGHT:  moderately severe mixed hearing loss   LEFT:   moderately severe mixed hearing loss    Speech Reception Threshold:   RIGHT:  50 dB HL   LEFT:    40 dB HL    Word Recognition Score:    RIGHT:  76% at 80 dB HL using NU-6 recorded word list   LEFT:    80% at 80 dB HL using NU-6 recorded word list    ASSESSMENT:  Bilateral mixed hearing loss  Disorder of both eustachian tubes  Tinnitus of both ears    Discussed results with the patient.     PLAN:  Patient was returned to ENT for follow up.     Sushila Elizalde.  Licensed Audiologist, [place] #[id number]  [facility]  3/27/2018

## 2018-03-27 NOTE — PATIENT INSTRUCTIONS
General Scheduling Information  To schedule your CT/MRI scan, please contact [place] at [phone]   [address] [place] [address]    To schedule your Surgery, please contact our Specialty Schedulers at [phone]    ENT Clinic Locations Clinic Hours Telephone Number     [facility]  [address] [place] [address]   Tuesday:       8:00am -- 4:00pm    Wednesday:  8:00am - 4:00pm   To schedule an appointment with   Dr. Robertson,   please contact our   Specialty Scheduling Department at:     [phone]       [facility]oon  [address] [place] [address]   Friday:          8:00am - 4:00pm         Urgent Care Locations Clinic Hours Telephone Numbers     [facility]  [address] [place] [address]     Monday-Friday:     11:00pm - 9:00pm    Saturday-Sunday:  9:00am - 5:00pm   [phone]     [facility] Poon  [address] [place] [address]     Monday-Friday:      5:00pm - 9:00pm     Saturday-Sunday:  9:00am - 5:00pm   [phone]

## 2018-03-27 NOTE — LETTER
3/27/2018         RE: Bg Kramer  [address] [address]        Dear Colleague,    Thank you for referring your patient, Bg Kramer, to the [facility]. Please see a copy of my visit note below.    Chief Complaint - Hearing loss    History of Present Illness - Bg Kramer is a 77 year old male who returns to me today with hearing loss or a plugged feeling in both ears.  It has been present and noticeable for approximately many months now. I last saw him 11/2017 and he had bilateral effusions then. He is status post bilateral myringotomy (No tubes) on 2/14/2017.  He did well, but ears are plugged again. No drainage. No pain. Can't hear well. Uses oxygen.     Past Medical History -   Patient Active Problem List   Diagnosis     Macular drusen, ou     Entropion, involutional, right lower eyelid; hx Quickert repair     PSEUDOPHAKIA, ou        Current Medications -   Current Outpatient Prescriptions:      ofloxacin (FLOXIN) 0.3 % otic solution, Place 5 drops into both ears 2 times daily for 5 days, Disp: 10 mL, Rfl: 0     fluticasone (FLONASE) 50 MCG/ACT spray, Spray 2 sprays into both nostrils daily, Disp: 16 g, Rfl: 3     ipratropium (ATROVENT) 0.06 % nasal spray, Spray 2 sprays into both nostrils 3 times daily as needed for rhinitis, Disp: 1 Box, Rfl: 3     LISINOPRIL PO, Take  by mouth., Disp: , Rfl:      SIMVASTATIN PO, Take 1 tablet by mouth once., Disp: , Rfl:      ATENOLOL PO, Take 1 tablet by mouth once., Disp: , Rfl:      Albuterol Sulfate (PROAIR HFA) 108 (90 BASE) MCG/ACT AERS, Inhale 1 puff into the lungs as needed., Disp: , Rfl:      Fluticasone-Salmeterol (ADVAIR DISKUS IN), Inhale 1 puff into the lungs 2 times daily., Disp: , Rfl:      aspirin 325 MG tablet, Take 325 mg by mouth 2 times daily., Disp: , Rfl:      neomycin-polymyxin-dexamethasone (MAXITROL) 3.5-64761-4.1 OINT, Place 4 g into the right eye 3 times daily. Apply 1 squirt to sutures and right lower eyelid  "three times daily. Pieter Manuel M.D.  (Patient not taking: Reported on 3/27/2018), Disp: 1 Tube, Rfl: 1     prednisoLONE acetate (PRED FORTE) 1 % ophthalmic suspension, Place 1 drop Into the left eye 3 times daily. (Patient not taking: Reported on 3/27/2018), Disp: 10 mL, Rfl: 0     diclofenac (VOLTAREN) 0.1 % ophthalmic solution, Place 1 drop Into the left eye 3 times daily. (Patient not taking: Reported on 3/27/2018), Disp: 2.5 mL, Rfl: 0     fish oil-omega-3 fatty acids (FISH OIL) 1000 MG capsule, Take 2 g by mouth daily., Disp: , Rfl:     Allergies - No Known Allergies    Social History -   Social History     Social History     Marital status:      Spouse name: N/A     Number of children: N/A     Years of education: N/A     Social History Main Topics     Smoking status: Former Smoker     Smokeless tobacco: Never Used     Alcohol use None     Drug use: None     Sexual activity: Not Asked     Other Topics Concern     None     Social History Narrative       Family History - no chronic ear disease noted.     Review of Systems - As per HPI and PMHx, otherwise 7 system review of the head and neck negative.    Physical Exam  BP 94/55  Pulse 68  Resp 20  Ht 1.727 m (5' 8\")  Wt 79.5 kg (175 lb 3.2 oz)  SpO2 93%  BMI 26.64 kg/m2  General - The patient is nontoxic, in no distress.  Alert and oriented to person and place, answers questions and cooperates with examination appropriately.   Voice and Breathing - The patient was breathing comfortably without the use of accessory muscles. There was no wheezing, stridor, or stertor.  The patients voice was clear and strong.  Ears - The tympanic membrane on the R is intact, but appears dull with a middle ear effusion. No acute infection.  No fluid or purulence was seen in the external canal. The tympanic membrane on the L is intact, + middle ear effusion. No acute infection.  No fluid or purulence was seen in the external canal.   Eyes - Extraocular movements " intact. Sclera were not icteric or injected.  Neck - Palpation of the occipital, submental, submandibular, internal jugular chain, and supraclavicular nodes did not demonstrate any abnormal lymph nodes or masses. No parotid masses. Palpation of the thyroid was soft and smooth, with no nodules or goiter appreciated.  The trachea was mobile and midline.  Neurological - Cranial nerves 2 through 12 were grossly intact. House-Brackmann grade 1 out of 6 bilaterally.    Audiologic Studies - An audiogram and tympanogram were performed today as part of the evaluation and personally reviewed. The tympanogram shows a type B, low volume on both sides.  The audiogram showed an air bone gap on both sides.    Bilateral Myringotomy with Tube Placement    Procedure - After discussion of the risks and benefits of myringotomy, informed consent was signed and placed in the chart.  I began with the left side.  I proceeded to position the patient in a semi-supine position in the examination chair.  Using the binocular surgical microscope, I then proceeded to clean the left ear canal free cerumen and squamous debris.  I was able to see the tympanic membrane.  Using a small cotton tipped applicator, I applied a tiny coating of phenol onto the tympanic membrane.  After visualizing a good diane, I then proceeded to use a myringotomy knife to make a radially oriented incision in the posterior and inferior quadrant of the left tympanic membrane.  A moderate amount of clear yellow effusion was suctioned away.  Next, I proceeded to place a duravent tube through the incision.  After confirming good positioning and a clearly visible open tube, the procedure was complete.    I know moved on to the right side. Using the binocular surgical microscope, I then proceeded to clean the canal of cerumen and squamous debris.  I was able to see the tympanic membrane. He has some retraction of the right anterior tympanic membrane, but no cholesteatoma.   Using a small cotton tipped applicator, I applied a tiny coating of phenol onto the tympanic membrane.  After visualizing a good diane, I then proceeded to use a myringotomy knife to make a radially oriented incision in the inferior and posterior tympanic membrane.  A moderate amount of clear yellow effusion was suctioned away.  Next, I proceeded to place a duravent tube through the incision.  After confirming good positioning and a clearly visible open tube, the procedure was complete.    Assessment and Plan - Bg Kramer is a 77 year old male who returns to me today with hearing loss.  This is most consistent with a middle ear effusion in the both ears. Is a chronic issue as I have done just myringotomies in the past. Today with placed tubes.   The patient was instructed on water precautions and given a prescription for ofloxacin drops to use for 5 days.  I will see them in 2 to 4 weeks for follow up. Only needs repeat audiogram if hearing is still down.  The patient was instructed to call in or return sooner if there was any drainage from the ear.    Carmine Robertson MD  Otolaryngology  [facility]      Again, thank you for allowing me to participate in the care of your patient.        Sincerely,        Carmine Robertson MD

## 2018-03-27 NOTE — MR AVS SNAPSHOT
After Visit Summary   3/27/2018    Bg Kramer    MRN: [id number]           Patient Information     Date Of Birth          1940        Visit Information        Provider Department      3/27/2018 2:30 PM Carmine Robertson MD [facility]        Today's Diagnoses     OME (otitis media with effusion), bilateral    -  1    S/P myringotomy with insertion of tube          Care Instructions    General Scheduling Information  To schedule your CT/MRI scan, please contact [place] Connor at [phone] [address] [place] [address]    To schedule your Surgery, please contact our Specialty Schedulers at [phone]    ENT Clinic Locations Clinic Hours Telephone Number     [facility]  [address]  [place] [address]   Tuesday:       8:00am -- 4:00pm    Wednesday:  8:00am - 4:00pm   To schedule an appointment with   Dr. Robertson,   please contact our   Specialty Scheduling Department at:     [phone]       [facility]  [address] [place] [address]   Friday:          8:00am - 4:00pm         Urgent Care Locations Clinic Hours Telephone Numbers     [facility]  [address] [place] [address]     Monday-Friday:     11:00pm - 9:00pm    Saturday-Sunday:  9:00am - 5:00pm   [phone]     [facility]  [address] [place] [address]     Monday-Friday:      5:00pm - 9:00pm     Saturday-Sunday:  9:00am - 5:00pm   [phone]                 Follow-ups after your visit        Additional Services     AUDIOLOGY ADULT REFERRAL       Your provider has referred you to: [facility]: [facility] [phone]   [email or web address]    Treatment:  Evaluation & Treatment  Specialty Testing:  Audiogram w/Tymps and Reflexes    Please be aware that coverage of these services is subject to the terms and limitations of your health insurance plan.  Call member services at your health plan with any  "benefit or coverage questions.      Please bring the following to your appointment:  >>   Any x-rays, CTs or MRIs which have been performed.  Contact the facility where they were done to arrange for  prior to your scheduled appointment.   >>   List of current medications  >>   This referral request   >>   Any documents/labs given to you for this referral                  Who to contact     If you have questions or need follow up information about today's clinic visit or your schedule please contact [facility] directly at [phone].  Normal or non-critical lab and imaging results will be communicated to you by [employer] Multimediahart, letter or phone within 4 business days after the clinic has received the results. If you do not hear from us within 7 days, please contact the clinic through [employer] Mult[employer] or phone. If you have a critical or abnormal lab result, we will notify you by phone as soon as possible.  Submit refill requests through Ba[employer] or call your pharmacy and they will forward the refill request to us. Please allow 3 business days for your refill to be completed.          Additional Information About Your Visit        Baifendian Information     [employer] lets you send messages to your doctor, view your test results, renew your prescriptions, schedule appointments and more. To sign up, go to [email or web address] . Click on \"Log in\" on the left side of the screen, which will take you to the Welcome page. Then click on \"Sign up Now\" on the right side of the page.     You will be asked to enter the access code listed below, as well as some personal information. Please follow the directions to create your username and password.     Your access code is: [email or web address]  Expires: 2018  2:43 PM     Your access code will  in 90 days. If you need help or a new code, please call your [facility] or [phone].        Care EveryWhere ID     This is your Care EveryWhere ID. This could be used by other " "organizations to access your [place] medical records  [id number]        Your Vitals Were     Pulse Respirations Height Pulse Oximetry BMI (Body Mass Index)       68 20 1.727 m (5' 8\") 93% 26.64 kg/m2        Blood Pressure from Last 3 Encounters:   03/27/18 94/55    Weight from Last 3 Encounters:   03/27/18 79.5 kg (175 lb 3.2 oz)   11/01/17 87.3 kg (192 lb 6.4 oz)   03/08/17 90.3 kg (199 lb)              We Performed the Following     AUDIOLOGY ADULT REFERRAL     Tympanotomy W/Tube          Today's Medication Changes          These changes are accurate as of 3/27/18  4:42 PM.  If you have any questions, ask your nurse or doctor.               Start taking these medicines.        Dose/Directions    ofloxacin 0.3 % otic solution   Commonly known as:  FLOXIN   Used for:  OME (otitis media with effusion), bilateral, S/P myringotomy with insertion of tube   Started by:  Carmine Robertson MD        Dose:  5 drop   Place 5 drops into both ears 2 times daily for 5 days   Quantity:  10 mL   Refills:  0            Where to get your medicines      These medications were sent to [facility] [place] [address] [address] [place] [address], [facility] [address]     Phone:  [phone]     ofloxacin 0.3 % otic solution                Primary Care Provider Office Phone # Fax #    Avril NATHAN Angulo PA-C [phone]       [facility] [address] [place] [address]        Equal Access to Services     [facility] EDUARD AH: Hadii aad trisha hadasho Soomaali, waaxda luqadaha, qaybta kaalmada adeegyada, carolann yen. So [facility] [phone].    ATENCIÓN: Si habla español, tiene a salas disposición servicios gratuitos de asistencia lingüística. Niesha al [phone].    We comply with applicable federal civil rights laws and [place] laws. We do not discriminate on the basis of race, color, national origin, age, disability, sex, sexual " orientation, or gender identity.            Thank you!     Thank you for choosing [facility]  for your care. Our goal is always to provide you with excellent care. Hearing back from our patients is one way we can continue to improve our services. Please take a few minutes to complete the written survey that you may receive in the mail after your visit with us. Thank you!             Your Updated Medication List - Protect others around you: Learn how to safely use, store and throw away your medicines at www.disposemymeds.org.          This list is accurate as of 3/27/18  4:42 PM.  Always use your most recent med list.                   Brand Name Dispense Instructions for use Diagnosis    ADVAIR DISKUS IN      Inhale 1 puff into the lungs 2 times daily.        aspirin 325 MG tablet      Take 325 mg by mouth 2 times daily.        ATENOLOL PO      Take 1 tablet by mouth once.        diclofenac 0.1 % ophthalmic solution    VOLTAREN    2.5 mL    Place 1 drop Into the left eye 3 times daily.    Cataract       fish oil-omega-3 fatty acids 1000 MG capsule      Take 2 g by mouth daily.        fluticasone 50 MCG/ACT spray    FLONASE    16 g    Spray 2 sprays into both nostrils daily    Chronic rhinitis       ipratropium 0.06 % spray    ATROVENT    1 Box    Spray 2 sprays into both nostrils 3 times daily as needed for rhinitis    Chronic rhinitis       LISINOPRIL PO      Take  by mouth.        neomycin-polymyxin-dexamethasone 3.5-43519-6.1 Oint ophthalmic ointment    MAXITROL    1 Tube    Place 4 g into the right eye 3 times daily. Apply 1 squirt to sutures and right lower eyelid three times daily. Pieter Manuel M.D.    Entropion       ofloxacin 0.3 % otic solution    FLOXIN    10 mL    Place 5 drops into both ears 2 times daily for 5 days    OME (otitis media with effusion), bilateral, S/P myringotomy with insertion of tube       prednisoLONE acetate 1 % ophthalmic susp    PRED FORTE    10 mL    Place 1 drop  Into the left eye 3 times daily.    Cataract       PROAIR  (90 BASE) MCG/ACT Inhaler   Generic drug:  albuterol      Inhale 1 puff into the lungs as needed.        SIMVASTATIN PO      Take 1 tablet by mouth once.

## 2018-03-27 NOTE — MR AVS SNAPSHOT
"              After Visit Summary   3/27/2018    Bg Kramer    MRN: [id number]           Patient Information     Date Of Birth          1940        Visit Information        Provider Department      3/27/2018 2:00 PM Donald Frias, [facility]        Today's Diagnoses     Mixed conductive and sensorineural hearing loss of both ears    -  1    Disorder of both eustachian tubes        Tinnitus of both ears           Follow-ups after your visit        Who to contact     If you have questions or need follow up information about today's clinic visit or your schedule please contact [facility] directly at [phone].  Normal or non-critical lab and imaging results will be communicated to you by LOVEFiLMhart, letter or phone within 4 business days after the clinic has received the results. If you do not hear from us within 7 days, please contact the clinic through LOVEFiLMhart or phone. If you have a critical or abnormal lab result, we will notify you by phone as soon as possible.  Submit refill requests through [employer] or call your pharmacy and they will forward the refill request to us. Please allow 3 business days for your refill to be completed.          Additional Information About Your Visit        MyChart Information     Presto Services lets you send messages to your doctor, view your test results, renew your prescriptions, schedule appointments and more. To sign up, go to [email or web address] . Click on \"Log in\" on the left side of the screen, which will take you to the Welcome page. Then click on \"Sign up Now\" on the right side of the page.     You will be asked to enter the access code listed below, as well as some personal information. Please follow the directions to create your username and password.     Your access code is: [email or web address]  Expires: 2018  2:43 PM     Your access code will  in 90 days. If you need help or a new code, please call your [place] clinic or " [phone].        Care EveryWhere ID     This is your Care EveryWhere ID. This could be used by other organizations to access your [place] medical records  [id number]         Blood Pressure from Last 3 Encounters:   03/27/18 94/55    Weight from Last 3 Encounters:   03/27/18 175 lb 3.2 oz (79.5 kg)   11/01/17 192 lb 6.4 oz (87.3 kg)   03/08/17 199 lb (90.3 kg)              We Performed the Following     AUDIOGRAM/TYMPANOGRAM - INTERFACE     COMPREHENSIVE HEARING TEST     TYMPANOMETRY        Primary Care Provider Office Phone # Fax #    Avril NATHAN Angulo PA-C [phone]       [facility] [address] [place] [address]        Equal Access to Services     [facility] CHAPA : Hadii gaby ku hadasho Soomaali, waaxda luqadaha, qaybta kaalmada adeegyada, waxay idiin haymaritzan lalita meehan . So [facility] [phone].    ATENCIÓN: Si habla español, tiene a salas disposición servicios gratuitos de asistencia lingüística. Llame al [phone].    We comply with applicable federal civil rights laws and [place] laws. We do not discriminate on the basis of race, color, national origin, age, disability, sex, sexual orientation, or gender identity.            Thank you!     Thank you for choosing [facility]  for your care. Our goal is always to provide you with excellent care. Hearing back from our patients is one way we can continue to improve our services. Please take a few minutes to complete the written survey that you may receive in the mail after your visit with us. Thank you!             Your Updated Medication List - Protect others around you: Learn how to safely use, store and throw away your medicines at www.disposemymeds.org.          This list is accurate as of 3/27/18  2:43 PM.  Always use your most recent med list.                   Brand Name Dispense Instructions for use Diagnosis    ADVAIR DISKUS IN      Inhale 1 puff into the lungs 2 times daily.        aspirin 325 MG  tablet      Take 325 mg by mouth 2 times daily.        ATENOLOL PO      Take 1 tablet by mouth once.        diclofenac 0.1 % ophthalmic solution    VOLTAREN    2.5 mL    Place 1 drop Into the left eye 3 times daily.    Cataract       fish oil-omega-3 fatty acids 1000 MG capsule      Take 2 g by mouth daily.        fluticasone 50 MCG/ACT spray    FLONASE    16 g    Spray 2 sprays into both nostrils daily    Chronic rhinitis       ipratropium 0.06 % spray    ATROVENT    1 Box    Spray 2 sprays into both nostrils 3 times daily as needed for rhinitis    Chronic rhinitis       LISINOPRIL PO      Take  by mouth.        neomycin-polymyxin-dexamethasone 3.5-60329-4.1 Oint ophthalmic ointment    MAXITROL    1 Tube    Place 4 g into the right eye 3 times daily. Apply 1 squirt to sutures and right lower eyelid three times daily. Pieter Manuel M.D.    Entropion       prednisoLONE acetate 1 % ophthalmic susp    PRED FORTE    10 mL    Place 1 drop Into the left eye 3 times daily.    Cataract       PROAIR  (90 BASE) MCG/ACT Inhaler   Generic drug:  albuterol      Inhale 1 puff into the lungs as needed.        SIMVASTATIN PO      Take 1 tablet by mouth once.

## 2018-05-14 DIAGNOSIS — J31.0 CHRONIC RHINITIS: ICD-10-CM

## 2018-05-14 RX ORDER — FLUTICASONE PROPIONATE 50 MCG
2 SPRAY, SUSPENSION (ML) NASAL DAILY
Qty: 16 G | Refills: 3 | Status: SHIPPED | OUTPATIENT
Start: 2018-05-14 | End: 2020-04-07

## 2020-02-19 ENCOUNTER — OFFICE VISIT (OUTPATIENT)
Dept: OTOLARYNGOLOGY | Facility: CLINIC | Age: 80
End: 2020-02-19
Payer: COMMERCIAL

## 2020-02-19 VITALS
WEIGHT: 184 LBS | HEART RATE: 68 BPM | HEIGHT: 68 IN | OXYGEN SATURATION: 93 % | BODY MASS INDEX: 27.89 KG/M2 | SYSTOLIC BLOOD PRESSURE: 128 MMHG | RESPIRATION RATE: 16 BRPM | DIASTOLIC BLOOD PRESSURE: 64 MMHG

## 2020-02-19 DIAGNOSIS — H65.92 OME (OTITIS MEDIA WITH EFFUSION), LEFT: Primary | ICD-10-CM

## 2020-02-19 DIAGNOSIS — H61.23 BILATERAL IMPACTED CERUMEN: ICD-10-CM

## 2020-02-19 PROCEDURE — 69433 CREATE EARDRUM OPENING: CPT | Mod: LT | Performed by: OTOLARYNGOLOGY

## 2020-02-19 PROCEDURE — 99213 OFFICE O/P EST LOW 20 MIN: CPT | Mod: 25 | Performed by: OTOLARYNGOLOGY

## 2020-02-19 PROCEDURE — 69210 REMOVE IMPACTED EAR WAX UNI: CPT | Mod: 51 | Performed by: OTOLARYNGOLOGY

## 2020-02-19 RX ORDER — OFLOXACIN 3 MG/ML
5 SOLUTION AURICULAR (OTIC) 2 TIMES DAILY
Qty: 10 ML | Refills: 0 | Status: SHIPPED | OUTPATIENT
Start: 2020-02-19

## 2020-02-19 ASSESSMENT — MIFFLIN-ST. JEOR: SCORE: 1529.12

## 2020-02-19 ASSESSMENT — PAIN SCALES - GENERAL: PAINLEVEL: NO PAIN (0)

## 2020-02-19 NOTE — LETTER
2/19/2020         RE: Bg Kraemr  [address] [facility] [id number]        Dear Colleague,    Thank you for referring your patient, Bg Kramer, to the [facility]. Please see a copy of my visit note below.    Chief Complaint - Hearing loss    History of Present Illness - Bg Kramer is a 79 year old male who returns to me today with hearing loss or a plugged feeling in both ears.  It has been present and noticeable for approximately a few months. I saw him 11/2017 and 3/2018 and he had bilateral effusions. He is status post bilateral myringotomy (No tubes) on 2/14/2017 and bilateral myringotomy with tubes 3/2018.  He did well for awhile with the tubes, but ears are plugged again. No drainage. Some left ear pain. Can't hear well. Uses oxygen.     Past Medical History -   Patient Active Problem List   Diagnosis     Macular drusen, ou     Entropion, involutional, right lower eyelid; hx Quickert repair     PSEUDOPHAKIA, ou    COPD    Current Medications -   Current Outpatient Medications:      Albuterol Sulfate (PROAIR HFA) 108 (90 BASE) MCG/ACT AERS, Inhale 1 puff into the lungs as needed., Disp: , Rfl:      aspirin 325 MG tablet, Take 325 mg by mouth 2 times daily., Disp: , Rfl:      ATENOLOL PO, Take 1 tablet by mouth once., Disp: , Rfl:      diclofenac (VOLTAREN) 0.1 % ophthalmic solution, Place 1 drop Into the left eye 3 times daily. (Patient not taking: Reported on 3/27/2018), Disp: 2.5 mL, Rfl: 0     fish oil-omega-3 fatty acids (FISH OIL) 1000 MG capsule, Take 2 g by mouth daily., Disp: , Rfl:      fluticasone (FLONASE) 50 MCG/ACT spray, Spray 2 sprays into both nostrils daily, Disp: 16 g, Rfl: 3     Fluticasone-Salmeterol (ADVAIR DISKUS IN), Inhale 1 puff into the lungs 2 times daily., Disp: , Rfl:      ipratropium (ATROVENT) 0.06 % nasal spray, Spray 2 sprays into both nostrils 3 times daily as needed for rhinitis, Disp: 1 Box, Rfl: 3     LISINOPRIL PO, Take  by mouth., Disp: , Rfl:  "     neomycin-polymyxin-dexamethasone (MAXITROL) 3.5-05378-8.1 OINT, Place 4 g into the right eye 3 times daily. Apply 1 squirt to sutures and right lower eyelid three times daily. Pieter Manuel M.D.  (Patient not taking: Reported on 3/27/2018), Disp: 1 Tube, Rfl: 1     prednisoLONE acetate (PRED FORTE) 1 % ophthalmic suspension, Place 1 drop Into the left eye 3 times daily. (Patient not taking: Reported on 3/27/2018), Disp: 10 mL, Rfl: 0     SIMVASTATIN PO, Take 1 tablet by mouth once., Disp: , Rfl:     Allergies - No Known Allergies    Social History -   Social History     Social History     Marital status:      Spouse name: N/A     Number of children: N/A     Years of education: N/A     Social History Main Topics     Smoking status: Former Smoker     Smokeless tobacco: Never Used     Alcohol use None     Drug use: None     Sexual activity: Not Asked     Other Topics Concern     None     Social History Narrative       Family History - no chronic ear disease noted.     Review of Systems - As per HPI and PMHx, otherwise 7 system review of the head and neck negative.    Physical Exam  /64   Pulse 68   Resp 16   Ht 1.727 m (5' 8\")   Wt 83.5 kg (184 lb)   SpO2 93%   BMI 27.98 kg/m     General - The patient is nontoxic, in no distress.  Alert and oriented to person and place, answers questions and cooperates with examination appropriately.   Voice and Breathing - The patient was breathing comfortably without the use of accessory muscles. There was no wheezing, stridor, or stertor.  The patients voice was clear and strong.  Ears - both ear canals are impacted with cerumen.  I laid the patient supine use the otic microscope.  First on the right side is a curette to scoop out multiple clumps of wax including an extruded tube.  The tympanic membrane on the R is intact, this time no middle ear effusion. No acute infection.  No fluid or purulence was seen in the external canal.  I then used the otic " microscope on the left side.  Again copious amounts of wax a scooped out.  The tympanic membrane on the L is intact, + middle ear effusion, lepe. No acute infection.  No fluid or purulence was seen in the external canal.   Eyes - Extraocular movements intact. Sclera were not icteric or injected.  Neck - Palpation of the occipital, submental, submandibular, internal jugular chain, and supraclavicular nodes did not demonstrate any abnormal lymph nodes or masses. No parotid masses. Palpation of the thyroid was soft and smooth, with no nodules or goiter appreciated.  The trachea was mobile and midline.  Neurological - Cranial nerves 2 through 12 were grossly intact. House-Brackmann grade 1 out of 6 bilaterally.    Left Myringotomy with Tube Placement     Procedure - After discussion of the risks and benefits of myringotomy, informed consent was signed and placed in the chart.  I began with the left side.  Using the binocular surgical microscope,  I was able to see the tympanic membrane. I then proceeded to use a myringotomy knife to make a radially oriented incision in the posterior and inferior quadrant of the left tympanic membrane through a monomer where his last tube was.  A large amount of clear yellow effusion was suctioned away.  Next, I proceeded to place a duravent tube through the incision.  After confirming good positioning and a clearly visible open tube, the procedure was complete.        Assessment and Plan - Bg Kramer is a 79 year old male who returns to me today with hearing loss.  This is most consistent with bilateral cerumen impaction and a middle ear effusion of the left ear. This is a chronic issue as I have done myringotomies with tubes in the past. Today I placed a left tube and cleaned both ears.    The patient was instructed on water precautions and given a prescription for ofloxacin drops to use for 5 days.  Return in 6 months, sooner if he has issues again.    Carmine Robertson  MD  Otolaryngology  [facility]      Again, thank you for allowing me to participate in the care of your patient.        Sincerely,        Carmine Robertson MD

## 2020-02-19 NOTE — PROGRESS NOTES
Chief Complaint - Hearing loss    History of Present Illness - Bg Kramer is a 79 year old male who returns to me today with hearing loss or a plugged feeling in both ears.  It has been present and noticeable for approximately a few months. I saw him 11/2017 and 3/2018 and he had bilateral effusions. He is status post bilateral myringotomy (No tubes) on 2/14/2017 and bilateral myringotomy with tubes 3/2018.  He did well for awhile with the tubes, but ears are plugged again. No drainage. Some left ear pain. Can't hear well. Uses oxygen.     Past Medical History -   Patient Active Problem List   Diagnosis     Macular drusen, ou     Entropion, involutional, right lower eyelid; hx Quickert repair     PSEUDOPHAKIA, ou    COPD    Current Medications -   Current Outpatient Medications:      Albuterol Sulfate (PROAIR HFA) 108 (90 BASE) MCG/ACT AERS, Inhale 1 puff into the lungs as needed., Disp: , Rfl:      aspirin 325 MG tablet, Take 325 mg by mouth 2 times daily., Disp: , Rfl:      ATENOLOL PO, Take 1 tablet by mouth once., Disp: , Rfl:      diclofenac (VOLTAREN) 0.1 % ophthalmic solution, Place 1 drop Into the left eye 3 times daily. (Patient not taking: Reported on 3/27/2018), Disp: 2.5 mL, Rfl: 0     fish oil-omega-3 fatty acids (FISH OIL) 1000 MG capsule, Take 2 g by mouth daily., Disp: , Rfl:      fluticasone (FLONASE) 50 MCG/ACT spray, Spray 2 sprays into both nostrils daily, Disp: 16 g, Rfl: 3     Fluticasone-Salmeterol (ADVAIR DISKUS IN), Inhale 1 puff into the lungs 2 times daily., Disp: , Rfl:      ipratropium (ATROVENT) 0.06 % nasal spray, Spray 2 sprays into both nostrils 3 times daily as needed for rhinitis, Disp: 1 Box, Rfl: 3     LISINOPRIL PO, Take  by mouth., Disp: , Rfl:      neomycin-polymyxin-dexamethasone (MAXITROL) 3.5-85371-9.1 OINT, Place 4 g into the right eye 3 times daily. Apply 1 squirt to sutures and right lower eyelid three times daily. Pieter Manuel M.D.  (Patient not taking: Reported  "on 3/27/2018), Disp: 1 Tube, Rfl: 1     prednisoLONE acetate (PRED FORTE) 1 % ophthalmic suspension, Place 1 drop Into the left eye 3 times daily. (Patient not taking: Reported on 3/27/2018), Disp: 10 mL, Rfl: 0     SIMVASTATIN PO, Take 1 tablet by mouth once., Disp: , Rfl:     Allergies - No Known Allergies    Social History -   Social History     Social History     Marital status:      Spouse name: N/A     Number of children: N/A     Years of education: N/A     Social History Main Topics     Smoking status: Former Smoker     Smokeless tobacco: Never Used     Alcohol use None     Drug use: None     Sexual activity: Not Asked     Other Topics Concern     None     Social History Narrative       Family History - no chronic ear disease noted.     Review of Systems - As per HPI and PMHx, otherwise 7 system review of the head and neck negative.    Physical Exam  /64   Pulse 68   Resp 16   Ht 1.727 m (5' 8\")   Wt 83.5 kg (184 lb)   SpO2 93%   BMI 27.98 kg/m    General - The patient is nontoxic, in no distress.  Alert and oriented to person and place, answers questions and cooperates with examination appropriately.   Voice and Breathing - The patient was breathing comfortably without the use of accessory muscles. There was no wheezing, stridor, or stertor.  The patients voice was clear and strong.  Ears - both ear canals are impacted with cerumen.  I laid the patient supine use the otic microscope.  First on the right side is a curette to scoop out multiple clumps of wax including an extruded tube.  The tympanic membrane on the R is intact, this time no middle ear effusion. No acute infection.  No fluid or purulence was seen in the external canal.  I then used the otic microscope on the left side.  Again copious amounts of wax a scooped out.  The tympanic membrane on the L is intact, + middle ear effusion, lepe. No acute infection.  No fluid or purulence was seen in the external canal.   Eyes - " Extraocular movements intact. Sclera were not icteric or injected.  Neck - Palpation of the occipital, submental, submandibular, internal jugular chain, and supraclavicular nodes did not demonstrate any abnormal lymph nodes or masses. No parotid masses. Palpation of the thyroid was soft and smooth, with no nodules or goiter appreciated.  The trachea was mobile and midline.  Neurological - Cranial nerves 2 through 12 were grossly intact. House-Brackmann grade 1 out of 6 bilaterally.    Left Myringotomy with Tube Placement     Procedure - After discussion of the risks and benefits of myringotomy, informed consent was signed and placed in the chart.  I began with the left side.  Using the binocular surgical microscope,  I was able to see the tympanic membrane. I then proceeded to use a myringotomy knife to make a radially oriented incision in the posterior and inferior quadrant of the left tympanic membrane through a monomer where his last tube was.  A large amount of clear yellow effusion was suctioned away.  Next, I proceeded to place a duravent tube through the incision.  After confirming good positioning and a clearly visible open tube, the procedure was complete.        Assessment and Plan - Bg Kramer is a 79 year old male who returns to me today with hearing loss.  This is most consistent with bilateral cerumen impaction and a middle ear effusion of the left ear. This is a chronic issue as I have done myringotomies with tubes in the past. Today I placed a left tube and cleaned both ears.    The patient was instructed on water precautions and given a prescription for ofloxacin drops to use for 5 days.  Return in 6 months, sooner if he has issues again.    Carmine Robertson MD  Otolaryngology  [facility]

## 2020-04-07 ENCOUNTER — TELEPHONE (OUTPATIENT)
Dept: OTOLARYNGOLOGY | Facility: CLINIC | Age: 80
End: 2020-04-07

## 2020-04-07 DIAGNOSIS — J31.0 CHRONIC RHINITIS: ICD-10-CM

## 2020-04-07 RX ORDER — FLUTICASONE PROPIONATE 50 MCG
2 SPRAY, SUSPENSION (ML) NASAL DAILY
Qty: 16 G | Refills: 3 | Status: SHIPPED | OUTPATIENT
Start: 2020-04-07

## 2020-04-07 NOTE — TELEPHONE ENCOUNTER
Pending Prescriptions:                       Disp   Refills    fluticasone (FLONASE) 50 MCG/ACT nasal sp*16 g   3            Sig: Spray 2 sprays into both nostrils daily    RN refilled medication per [facility] Refill Protocol.     Saira Malik RN

## 2020-04-07 NOTE — TELEPHONE ENCOUNTER
Reason for call:  Medication      If this is a refill request, has the caller requested the refill from the pharmacy already? Yes     Will the patient be using a [facility]? No     Name of the pharmacy and phone number for the current request: [facility] [phone]    Name of the medication requested: fluticasone (FLONASE) 50 MCG/ACT spray    Other request: na    Phone number to reach patient:  Home number on file [phone] (home)    Best Time:  any    Can we leave a detailed message on this number?  YES    Travel screening: Not Applicable